# Patient Record
Sex: FEMALE | Race: WHITE | Employment: STUDENT | URBAN - METROPOLITAN AREA
[De-identification: names, ages, dates, MRNs, and addresses within clinical notes are randomized per-mention and may not be internally consistent; named-entity substitution may affect disease eponyms.]

---
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
## 2019-01-28 ENCOUNTER — HOSPITAL ENCOUNTER (EMERGENCY)
Facility: CLINIC | Age: 19
Discharge: HOME | End: 2019-01-28
Attending: EMERGENCY MEDICINE | Admitting: EMERGENCY MEDICINE
Payer: COMMERCIAL

## 2019-01-28 VITALS
OXYGEN SATURATION: 99 % | DIASTOLIC BLOOD PRESSURE: 81 MMHG | SYSTOLIC BLOOD PRESSURE: 113 MMHG | RESPIRATION RATE: 18 BRPM | HEART RATE: 57 BPM | TEMPERATURE: 98.1 F | WEIGHT: 160 LBS | BODY MASS INDEX: 26.63 KG/M2

## 2019-01-28 DIAGNOSIS — F32.A DEPRESSION, UNSPECIFIED DEPRESSION TYPE: ICD-10-CM

## 2019-01-28 LAB
AMPHETAMINES UR QL SCN: NEGATIVE
ANION GAP SERPL CALCULATED.3IONS-SCNC: 7 MMOL/L (ref 3–14)
BARBITURATES UR QL: NEGATIVE
BASOPHILS # BLD AUTO: 0.1 10E9/L (ref 0–0.2)
BASOPHILS NFR BLD AUTO: 0.6 %
BENZODIAZ UR QL: NEGATIVE
BUN SERPL-MCNC: 12 MG/DL (ref 7–30)
CALCIUM SERPL-MCNC: 8.7 MG/DL (ref 8.5–10.1)
CANNABINOIDS UR QL SCN: POSITIVE
CHLORIDE SERPL-SCNC: 109 MMOL/L (ref 96–110)
CO2 SERPL-SCNC: 23 MMOL/L (ref 20–32)
COCAINE UR QL: NEGATIVE
CREAT SERPL-MCNC: 0.71 MG/DL (ref 0.5–1)
DIFFERENTIAL METHOD BLD: NORMAL
EOSINOPHIL # BLD AUTO: 0.1 10E9/L (ref 0–0.7)
EOSINOPHIL NFR BLD AUTO: 1.3 %
ERYTHROCYTE [DISTWIDTH] IN BLOOD BY AUTOMATED COUNT: 12.1 % (ref 10–15)
ETHANOL SERPL-MCNC: <0.01 G/DL
GFR SERPL CREATININE-BSD FRML MDRD: >90 ML/MIN/{1.73_M2}
GLUCOSE SERPL-MCNC: 89 MG/DL (ref 70–99)
HCG SERPL QL: NEGATIVE
HCT VFR BLD AUTO: 40.7 % (ref 35–47)
HGB BLD-MCNC: 13.7 G/DL (ref 11.7–15.7)
IMM GRANULOCYTES # BLD: 0 10E9/L (ref 0–0.4)
IMM GRANULOCYTES NFR BLD: 0.2 %
LITHIUM SERPL-SCNC: <0.2 MMOL/L (ref 0.6–1.2)
LYMPHOCYTES # BLD AUTO: 1.9 10E9/L (ref 0.8–5.3)
LYMPHOCYTES NFR BLD AUTO: 22.7 %
MCH RBC QN AUTO: 30.9 PG (ref 26.5–33)
MCHC RBC AUTO-ENTMCNC: 33.7 G/DL (ref 31.5–36.5)
MCV RBC AUTO: 92 FL (ref 78–100)
MONOCYTES # BLD AUTO: 0.8 10E9/L (ref 0–1.3)
MONOCYTES NFR BLD AUTO: 9.7 %
NEUTROPHILS # BLD AUTO: 5.4 10E9/L (ref 1.6–8.3)
NEUTROPHILS NFR BLD AUTO: 65.5 %
NRBC # BLD AUTO: 0 10*3/UL
NRBC BLD AUTO-RTO: 0 /100
OPIATES UR QL SCN: NEGATIVE
PCP UR QL SCN: NEGATIVE
PLATELET # BLD AUTO: 269 10E9/L (ref 150–450)
POTASSIUM SERPL-SCNC: 4.1 MMOL/L (ref 3.4–5.3)
RBC # BLD AUTO: 4.43 10E12/L (ref 3.8–5.2)
SODIUM SERPL-SCNC: 139 MMOL/L (ref 133–144)
WBC # BLD AUTO: 8.3 10E9/L (ref 4–11)

## 2019-01-28 PROCEDURE — 36415 COLL VENOUS BLD VENIPUNCTURE: CPT | Performed by: EMERGENCY MEDICINE

## 2019-01-28 PROCEDURE — 85025 COMPLETE CBC W/AUTO DIFF WBC: CPT | Performed by: EMERGENCY MEDICINE

## 2019-01-28 PROCEDURE — 84703 CHORIONIC GONADOTROPIN ASSAY: CPT | Performed by: EMERGENCY MEDICINE

## 2019-01-28 PROCEDURE — 80178 ASSAY OF LITHIUM: CPT | Performed by: EMERGENCY MEDICINE

## 2019-01-28 PROCEDURE — 90791 PSYCH DIAGNOSTIC EVALUATION: CPT

## 2019-01-28 PROCEDURE — 99285 EMERGENCY DEPT VISIT HI MDM: CPT | Mod: 25

## 2019-01-28 PROCEDURE — 80320 DRUG SCREEN QUANTALCOHOLS: CPT | Performed by: EMERGENCY MEDICINE

## 2019-01-28 PROCEDURE — 80048 BASIC METABOLIC PNL TOTAL CA: CPT | Performed by: EMERGENCY MEDICINE

## 2019-01-28 PROCEDURE — 80307 DRUG TEST PRSMV CHEM ANLYZR: CPT | Performed by: EMERGENCY MEDICINE

## 2019-01-28 RX ORDER — METHYLPHENIDATE HYDROCHLORIDE 20 MG/1
40 TABLET ORAL 2 TIMES DAILY
COMMUNITY

## 2019-01-28 RX ORDER — LITHIUM CARBONATE 600 MG/1
600 CAPSULE ORAL
COMMUNITY

## 2019-01-28 ASSESSMENT — ENCOUNTER SYMPTOMS
NERVOUS/ANXIOUS: 1
DYSPHORIC MOOD: 1

## 2019-01-28 NOTE — ED TRIAGE NOTES
"Pt presents c/o feeling depressed, \"unable to calm down and feels she's better off dead. Pt is A&O, ABC's intact, cooperative. Pt placed on KALEY, provider at bedside.   "

## 2019-01-28 NOTE — ED AVS SNAPSHOT
[facility] Emergency Department  [address]  [facility] [address]  Phone:  [phone]  Fax:  [phone]                                    Nancy Pinto   MRN: [id number]    Department:  [facility] Emergency Department   Date of Visit:  1/28/2019           After Visit Summary Signature Page    I have received my discharge instructions, and my questions have been answered. I have discussed any challenges I see with this plan with the nurse or doctor.    ..........................................................................................................................................  Patient/Patient Representative Signature      ..........................................................................................................................................  Patient Representative Print Name and Relationship to Patient    ..................................................               ................................................  Date                                   Time    ..........................................................................................................................................  Reviewed by Signature/Title    ...................................................              ..............................................  Date                                               Time          22EPIC Rev 08/18
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
## 2019-12-24 ENCOUNTER — HOSPITAL ENCOUNTER (EMERGENCY)
Facility: CLINIC | Age: 19
Discharge: HOME | End: 2019-12-24
Attending: PSYCHIATRY & NEUROLOGY | Admitting: PSYCHIATRY & NEUROLOGY
Payer: COMMERCIAL

## 2019-12-24 VITALS
OXYGEN SATURATION: 98 % | DIASTOLIC BLOOD PRESSURE: 82 MMHG | HEART RATE: 55 BPM | RESPIRATION RATE: 16 BRPM | TEMPERATURE: 98.7 F | SYSTOLIC BLOOD PRESSURE: 121 MMHG

## 2019-12-24 DIAGNOSIS — Z86.59 HISTORY OF BIPOLAR DISORDER: ICD-10-CM

## 2019-12-24 DIAGNOSIS — F43.0 EMOTIONAL CRISIS, ACUTE REACTION TO STRESS: ICD-10-CM

## 2019-12-24 PROCEDURE — 90791 PSYCH DIAGNOSTIC EVALUATION: CPT

## 2019-12-24 PROCEDURE — 80320 DRUG SCREEN QUANTALCOHOLS: CPT | Performed by: FAMILY MEDICINE

## 2019-12-24 PROCEDURE — 81025 URINE PREGNANCY TEST: CPT | Performed by: FAMILY MEDICINE

## 2019-12-24 PROCEDURE — 99283 EMERGENCY DEPT VISIT LOW MDM: CPT | Mod: Z6 | Performed by: PSYCHIATRY & NEUROLOGY

## 2019-12-24 PROCEDURE — 80307 DRUG TEST PRSMV CHEM ANLYZR: CPT | Performed by: FAMILY MEDICINE

## 2019-12-24 PROCEDURE — 99283 EMERGENCY DEPT VISIT LOW MDM: CPT | Performed by: PSYCHIATRY & NEUROLOGY

## 2019-12-24 ASSESSMENT — ENCOUNTER SYMPTOMS
MUSCULOSKELETAL NEGATIVE: 1
GASTROINTESTINAL NEGATIVE: 1
RESPIRATORY NEGATIVE: 1
NEUROLOGICAL NEGATIVE: 1
CARDIOVASCULAR NEGATIVE: 1
EYES NEGATIVE: 1
CONSTITUTIONAL NEGATIVE: 1
AGITATION: 1
NERVOUS/ANXIOUS: 1

## 2019-12-24 NOTE — ED AVS SNAPSHOT
[facility], [place], Emergency Department  [address]  [place] [address]  Phone:  [phone]  Fax:  [phone]                                    Nancy Pinto   MRN: [id number]    Department:  [facility], Emergency Department   Date of Visit:  12/24/2019           After Visit Summary Signature Page    I have received my discharge instructions, and my questions have been answered. I have discussed any challenges I see with this plan with the nurse or doctor.    ..........................................................................................................................................  Patient/Patient Representative Signature      ..........................................................................................................................................  Patient Representative Print Name and Relationship to Patient    ..................................................               ................................................  Date                                   Time    ..........................................................................................................................................  Reviewed by Signature/Title    ...................................................              ..............................................  Date                                               Time          22EPIC Rev 08/18
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
## 2019-12-24 NOTE — ED NOTES
"On assessment pt is denying any suicidal or homicidal ideations. Pt did state that she was pretty upset earlier when things escalated at the house, \"I have bipolar and I was really upset and was yelling and saying things\". Pt came in with a transport hold.  "

## 2019-12-24 NOTE — ED NOTES
Patient alert/oriented. Stable on feet. AVS reviewed. Patient denies questions or concerns at discharge, patient reports feeling safe to discharge. Patient belongings returned. Patient parents picking patient up and patient plans to go to work tonight. Safe to discharge.

## 2019-12-24 NOTE — ED NOTES
Bed: ED16B  Expected date: 12/24/19  Expected time: 2:36 PM  Means of arrival: Ambulance  Comments:  Paulo 18yo female, mental health eval on a hold, cooperative

## 2019-12-24 NOTE — DISCHARGE INSTRUCTIONS
Please restart your meds. Follow-up with your psychiatrist and work on clarifying your med regimen. Consider bringing your bag of meds so the unused ones can be properly disposed  Follow-up P-referred therapist  Follow-up [facility] care and services

## 2023-01-10 NOTE — DISCHARGE SUMMARY
"Psychiatric Discharge Summary    Nancy Pinto MRN# [id number]   Age: 16 year old YOB: 2000     Date of Admission:  1/6/2017  Date of Discharge:  1/10/2017  Admitting Physician:  Laxmi Rosales MD  Discharge Physician:  Laxmi Rosales MD          Event Leading to Hospitalization:   Nancy Pinto is a 16 year old female with a past psychiatric history of depression, anxiety, ADHD who presents with s/p suicide attempt/gesture--took \"some pills.\"  States for the past week has \"not been doing well\"--worsening depression and anxiety though feels depression is more predomininant.  Feels prior to starting oral extended release form of methylphenidate had been doing better with current meds and daytrana. Though had gotten benefit from the prozac and abilify rates improvement at 3/5 with 5 being best or where would like to have sxs be at.  Though had not worked with therapist for a while, plan was to restart therapy as had found it helpful.      Significant symptoms include SI, irritable, depressed, poor frustration tolerance, substance use, impulsive and anxiety.    There is genetic loading for mood and anxiety.  Medical history does appear to be significant for s/p OD.  Substance use does appear to be playing a contributing role in the patient's presentation.  Patient appears to cope with stress/frustration/emotions by using substances and acting out to self and immature defenses.  These limitations are adversely impacting sxs, treatment, function.  Current stressors that are exacerbating sxs include \"coming clean to my parents\" informing them has been sexually active and using drugs, chronic mental health issues.  Patient's support system includes family and peers.    Below are other factors impacting patients risks contributing to hospitalization and continued struggles with psychiatric and substance use disorders:  --Risk/precipitating factors: SI, maladaptive coping, substance use, impulsive and " [address]  Department of Interventional Radiology  [facility]  [phone] Office  [phone] Fax    DRAIN/PORT/CATHETER REMOVAL  DISCHARGE INSTRUCTIONS    General Information:     Your doctor has ordered for us to remove your drain, port, or catheter. This could be that you do not need it anymore, it is not doing its job, your physician has decided on another plan for your treatment and/or it may need replacing. Home Care Instructions: You can resume your regular diet and medication regimen. Do not drink alcohol, drive, or make any important legal decisions in the next 24 hours. Do not lift anything heavier than a gallon of milk, or do anything strenuous for the next 24 hours. You will notice a glue over the site of the removal. This glue will slowly fall off your skin. The nurse who discharges you to home should review with you any wound care instructions. Resume your normal level of activity slowly. You may shower after 24 hours, but do not take a bath, swim or immerse yourself in water until the site has healed. Pat site dry once you are done showering. Do not rub site. Call If:     You should call your Physician and/or the Radiology Nurse if you have any bleeding other than a small spot on your bandage. Call if you have any signs of infection, fever, or increased pain at the site of the tube. Call if the oozing increases, if it changes color, or begins to have an odor. Follow-Up Instructions: Please see your ordering doctor as he/she has requested. You have received sedation medications today. YOU SHOULD NOT DRIVE FOR 24 HOURS, DO NOT OPERATE HEAVY MACHINERY, DO NOT MAKE ANY LEGAL DECISIONS OR SIGN LEGAL DOCUMENTS FOR 24 HOURS. DO NOT DRINK ALCOHOL, TAKE ANY MEDICATIONS UNLESS PRESCRIBED BY YOUR DOCTOR. IF YOU ARE A CAREGIVER, SOMEONE SHOULD TAKE THAT ROLE FOR 24 HOURS.        Side effects of sedation medications and other medications used today have been reviewed  Those side effects can include but are not limited to: dizziness, drowsiness, poor balance, fatigue, sleepiness. Take precautions at home to prevent falls, such as assistance with walking or stairs if allowed and /or when needed or position changes. Allergic or adverse reactions could include nausea, itching, hives, dizziness, or anything else out of the ordinary. Should you experience any of these significant changes, please call [phone] between the hours of 7:30 am and 3:30 pm or [phone] after hours. After hours, ask the  to page the X-ray Technologist, and describe the problem to the technologist. If you are experiencing chest pain, shortness of breath, altered mental state, unusual bleeding or any other emergent symptom you should call 911 immediately.             Patient Signature:  Date: 1/10/2023  Discharging Nurse: Eron Moore RN past behaviors    --Predisposing factors:  family genetics, family dysfunction/insecure attachment, impulsive, substance use, maladaptive coping/limited insight, out of proportion aggression/reactions/neg pattern of behavior, appears likely dealing with highly expressed emotional level within family, poor access/inconsistent access to care/treatment, poor/adversive social supports/peers, limited socioeconomic resources    --Perpetuating factors:   SI, SIB, help rejecting pattern/limited acces to treatment, poor/limited treatment response, dysfunctional/hostile environments, transgenerational problems    Below are factors that could support resilience and improved prognosis:    --Protective factors: appropriate supports, engaged in school, appropriate coping and regulation ability, physically healthy, intact reality testing, cognitive function within normal    Medical necessity for hospitalization supported by:  --Risk for harm is moderate-high, as pt with inability to keep self safe and on going substance use further exacerbates sxs and impaired function to point where pt requiring structure, routine in a secured setting     --Appears ability to manage pt's safety and symptoms in family/community setting is currently overwhelmed necessitating need for close and continuous monitoring with active interventions    --Due to persistent concerns over safety, struggles with symptoms and function as noted above, pt admitted to [facility] for necessary safety measures unable to be provided at lower levels of care, for further monitoring, stabilization, and assessment of diagnoses, disposition needs.       See Admission note by Laxmi Rosales MD on 1/7/2017 for additional details.          Diagnoses/Plans/Hospital Course/Consults:     Principal Diagnosis: MDD, severe, recurrent, without psychotic features; ADHD-combined type    Unit: [facility]      Attending: Bobby       Medications: on going management as indicated; risks/benefits  discussed with guardian/patient.  At time of d/c pt reports tolerating, feeling pretty good with current med regimen, and denies SE.       --PTA medication is continued as below:             -Abilify 10 mg QHS targeting depression, moodiness             -Prozac daily dose increased to 30 mg QAM             -Methylphenidate was held as pt and mom had reported since starting oral extended release form when unable to get patch, difficulties with mood, behaviors, function started and progressively worsened.  Reviewed they will f/u with out pt psychiatrist as to how to proceed since pt also has failed trial of Adderall.      Secondary psychiatric diagnoses of concern this admission:    1-Unspecified Anxiety Disorder        -monitor, provide nonpharm support, medication as above     2-Cannabis Use Disorder          -monitor, attend groups, obtain collateral info, CD Assessment,  CD Education re research showing family involvement is an important component for treatment interventions targeting youth a strong recommendation is made for referral to fam therapy such as Multidimensional Family Therapy, an out-patient family based treatment or Functional Family Therapy which is a family systems based treatment approach that includes completing a functional family assessment to help understand how family problems/dysfunction contribute to maintenance of substance abuse and behavior problems. Recommend family attend Al-Anon and patient AA.  There is research supporting individuals with SUDs who participate in 12-step Self Help Groups tend to experience better alcohol and drug use outcomes than do individuals who do not participate in these groups.     3-Disruptive, Impulse Control Disorders         -monitor, review unit rules and expectations, development of safety/deescalating plans, nonpharmacological supports, medication as above            Consults:  - Peds for sexual health counseling, menorrhagia/dysmenorrhea and mild  transaminitis. Please refer to note written on 1/7/17 for additional information.        Medical diagnoses to be addressed this admission:  Sexual health, S/P OD ingestion, H/O Migraine HA  Plan: IP Pediatrics, monitor, supportive interventions as need, meds as need    Relevant psychosocial stressors: problems with primary support group; problems related to psychosocial environment/circumstance and appropriate social support;  academic problems    Legal Status: Voluntary    Safety Assessment:   Precautions: no additional  Patient was placed under status 15 (15 minute checks) to ensure patient safety.  Staff continued to monitor for safety throughout course of hospitalization.    Suicide Risk:  Risk Factors:  psychiatric disorder including mood disorder; prior SA; drug use which includes engaging in high risk behaviors and significant trouble/disciplinary problems; bullying victim; significant hopelessness/guilt/shame/worthlessness    Protective Factors:  some connections to family/friends/community; relatively easy access to appropriate clinical interventions; effective care for psychiatric/substance use/phyical disorders    Nancy Pinto did participate in groups and was visible in the milieu. Pt completed safety plan which was reviewed by staff with pt and family.  Pt and guardian/those involved in pt's care encouraged to use safety plan post hospitalization.    Patient participated in unit treatment groups and other interventions available as part of therapeutic milieu and during hospitalization was able to demonstrate use of appropriate adaptive coping skills.     The patient's symptoms of SI, irritable, depressed, poor frustration tolerance, substance use, impulsive and anxiety.improved throughout the course of hospitalization.    The following were results from assessments completed during pt's hospitalization.  Concern raised re CD issues. CD assessment completed, see 1/9/17 note.  Dimension Scale  Ratings:    Dimension 1: 0 Client displays full functioning with good ability to tolerate and cope with withdrawal discomfort. No signs or symptoms of intoxication or withdrawal or resolving signs or symptoms.    Dimension 2: 0 Client displays full functioning with good ability to cope with physical discomfort.    Dimension 3: 2 Client has difficulty with impulse control and lacks coping skills. Client has thoughts of suicide or harm to others without means; however, the thoughts may interfere with participation in some treatment activities. Client has difficulty functioning in significant life areas. Client has moderate symptoms of emotional, behavioral, or cognitive problems. Client is able to participate in most treatment activities.    Dimension 4: 1 Client is motivated with active reinforcement, to explore treatment and strategies for change, but ambivalent about illness or need for change.    Dimension 5: 2 (A) Client has minimal recognition and understanding of relapse and recidivism issues and displays moderate vulnerability for further substance use or mental health problems. (B) Client has some coping skills inconsistently applied.    Dimension 6: 0 Client is engaged in structured, meaningful activity and has a supportive significant other, family, and living environment.      Diagnostic Summary    Alcohol / Drug Use Disorder Diagnostic Criteria  A problematic pattern of alcohol/drug use leading to clinically significant impairment or distress, as manifested by at least two of the following, occurring within a 12-month period:              Recurrent alcohol/drug use resulting in a failure to fulfill major role obligations at work, school, or home.  Continued alcohol/ drug use despite having persistent or recurrent social or interpersonal problems caused or exacerbated by the effects of alcohol/drug.  Important social, occupational, or recreational activities are given up or reduced because of alcohol/drug  use.    DSM 5 Diagnosis:              305.20 (F12.10) Cannabis Use Disorder Mild  In a controlled environment      Due to patient reports of history of significant stress and discord within fam, Family assessment completed, see 1/8/17 note.  Therapist's Assessment  Parents presented as calm, pleasant, and cooperative. Parents are healthy, stable, and supportive people. It is easily observed they unconditionally love their daughter and are very concerned about her. Parents lack insight and understanding into mental health and their daughter's concerns. However, parents are very impressionable and are seeking feedback, suggestion, and insight. Parents are invested and at this point understand that personal change is needed to help facilitate change in their daughter. Parental subsystem is stable and intact. Hierarchy has been quite weak. Parents both admit they have been to lenient and give in often. Communication within parental subsystem seems to be healthy but lacks with their daughter. Cohesion seems to also be lacking, but desired by family. Emotional expression also seems to be a needed growth. Writer challenged and empowered parents to voice their future consistency, teamwork, and new expectations for the future. Writer also urged them to be active listeners but also voice their emotions. Parents welcome direction and seem to catch on quickly. Parents have reasonable expectations with intent of increasing safety and promoting health.    Pt joined the meeting. Pt presented as emotionally flat but calm and cooperative. Pt listened as parents and writer surfaced the concerns present. She nodded in agreement and never objected. Pt is help seeking and understands that she has been struggling. Pt was mature and accepting of everything that was said in the meeting. She remained respectful and open minded. Pt eventually became emotional and voiced her struggles with her parents, pertaining to the distance between them,  perceived disappointment in her, and few commonalities. Parents listened without interruption, did what writer thinks as their best job validating and acknowledging but attempted to make explanation. Their explanations were sound, and challenged their daughter's negative thinking but also may have minimized her truth behind her emotional struggles. The family had some beneficial, therapeutic dialogue that surfaced a lot of damaged emotions, and areas that need to be addressed. Communication, emotional expression, consistency, structure, hierarchy, and expectations were all touched on. Parents as well as pt did a good job of managing emotions but expressing them in a healthy way.     Safety Reminders: Spoke with mother and father regarding locking up medications. Family reports that patient will not have access to firearms or weapons.        Nancy Pinto was discharged to parent to con't treatment with community providers. At the time of discharge evaluation Nancy Pinto was determined to not be a danger to herself or others  (elevated to some degree given past behaviors, diagnoses).  .   Care was coordinated with pt, parent.         Labs:     Results for orders placed or performed during the hospital encounter of 01/06/17   PEDS IP consult: Patient to be seen: Routine within 24 hrs; Call back #: [phone]; pt would like to start birth control, poss. STD testing; Consultant may enter orders: Yes    Narrative    Hillary Khan APRN CNS     1/7/2017  3:27 PM    Pediatric Hospitalist Consult Note  01/07/2017    Nancy Pinto  MRN [id number]  YOB: 2000  Age: 16 year old  Date of Admission: 1/6/2017  8:02 PM    Reason for consult: I was asked by Laxmi Rosales MD to evaluate   Nancy for birth control counseling.      Subjective:  Nancy Pinto is a 16 year old female with a history of   depression & ADHD who is currently admitted to the  inpatient   psych unit secondary to  suicide attempt by drug overdose who   presents for birth control counseling. Nancy reports that she   is currently sexually active with a male partner. She has had a   total of 5-6 male partners. She was last sexually active one week   ago. No condom was used at that time. Nancy reports that she   tested positive for chlamydia 3 months ago, received treatment   and had repeat STI screening at [facility]. She was   reportedly tested for chlamydia, gonorrhea, HIV and syphilis at   that time, all of which were negative. She declines repeat   screening at this time.     Nancy reports a history of menorrhagia and dysmenorrhea. She   also reports nausea and sometimes vomiting the first day of her   period. Nancy also reports that she becomes more irritable with   mood swings around the time of her period. She denies a history   of anemia but reports a history of low iron stores. She expressed   an interest in oral contraceptives to help alleviate her   menstrual symptoms. Nancy reports a regular monthly period.   Last menstruated 1-2 weeks ago. Pregnancy test completed about 2   weeks ago at [facility], which was negative.       PAST MEDICAL HISTORY:   Past Medical History   Diagnosis Date     ADHD (attention deficit hyperactivity disorder)      Migraines      Concussion      x 2     Depression      Suicide attempt (H)      x2     Menorrhagia with regular cycle      Dysmenorrhea in adolescent      S/P appendectomy        PAST SURGICAL HISTORY:   Past Surgical History   Procedure Laterality Date     Appendectomy       5 years old       FAMILY HISTORY:   Family History   Problem Relation Age of Onset     CEREBROVASCULAR DISEASE Paternal Grandfather      DIABETES Paternal Grandfather        SOCIAL HISTORY:   Social History   Substance Use Topics     Smoking status: Current Every Day Smoker -- 0.25 packs/day     Smokeless tobacco: Former User     Alcohol Use: No       ALLERGIES:  Review of patient's  allergies indicates no known allergies.      MEDICATIONS:  I have reviewed this patient's current medications  Current Facility-Administered Medications   Medication     lidocaine (LMX4) kit     OLANZapine zydis (zyPREXA) ODT tab 5 mg    Or     OLANZapine (zyPREXA) injection 5 mg     diphenhydrAMINE (BENADRYL) capsule 25 mg    Or     diphenhydrAMINE (BENADRYL) injection 25 mg     hydrOXYzine (ATARAX) tablet 25 mg     ibuprofen (ADVIL/MOTRIN) tablet 600 mg     melatonin tablet 3 mg     alum & mag hydroxide-simethicone (MYLANTA ES/MAALOX  ES)   suspension 30 mL     benzocaine-menthol (CHLORASEPTIC) 6-10 MG lozenge 1 lozenge     calcium carbonate (TUMS) chewable tablet 500 mg       ROS: 10 point ROS neg other than the symptoms noted above in the   HPI.      Objective:    Vitals were reviewed  Temp: 97.5  F (36.4  C) Temp src: Oral BP: 114/71 mmHg Pulse: 81     Resp: 18   O2 Device: None (Room air)       Appearance: Alert and appropriate, well appearing, normally   responsive, no acute distress   HEENT: Head: Normocephalic and atraumatic. Eyes: Wearing glasses,   lids and lashes normal, PERRL, EOM grossly intact, conjunctivae   and sclerae clear. Ears: External ears without deformity or   lesions. Nose: No active discharge. Mouth/Throat: Oral mucosa   pink and moist, no oral lesions.   Respiratory: Respirations unlabored, no respiratory distress.  Gastrointestinal: Normal bowel sounds, soft, nontender,   nondistended, with no masses and no hepatosplenomegaly.  Neurologic: Alert and oriented, mentation intact, speech normal.   Cranial nerves II-XII grossly intact, moving all extremities   equally with grossly normal coordination, stable gait.   Neuropsychiatric: General: calm and normal eye contact Affect:   pleasant  Integument: normal skin color, texture, turgor, no rashes, no   lesions, no abnormal moles, nails without discoloration or   clubbing and no jaundice.       Labs:    CBC RESULTS:   Recent Labs   Lab Test   01/06/17   1248   WBC  7.5   RBC  4.57   HGB  13.2   HCT  40.8   MCV  89   MCH  28.9   MCHC  32.4   RDW  13.6   PLT  318     CMP: mildly elevated ALT (58) & AST (69), otherwise normal  U Hcg: negative  U Tox: positive for cannabinoids, otherwise negative        Assessment/Plan:    Birth Control Counseling  - Multiple birth control options discussed. Nancy expressed an   interest in starting oral contraceptives during this   hospitalization for pregnancy prevention and to help alleviate   menstrual symptoms reported. Unable to start oral contraceptives   during this visit given history of unprotected sex 1 week ago.   Will recheck serum pregnancy test on Thursday to confirm negative   pregnancy results and consider starting oral contraceptives at   that time if negative.  - Beta HCG scheduled for AM draw on 1/12/17.  - Pediatrics will follow up on results & intervene as indicated.    Sexual Health Counseling  - STI screening discussed as well as risks and benefits of early   diagnosis & treatment.  - STI screening declined at this time. Screening was completed at   [facility] ~ 2 weeks ago, negative for gonorrhea,   chlamydia, HIV & syphilis.  - Encouraged condom use to prevent STI transmission and prevent   pregnancy.  - Follow up every 3-6 months for STI screening with PCP or   [facility].    Menorrhagia, Dysmenorrhea  - Recommend oral contraceptives to help alleviate symptoms. Will   consider starting during this admission if repeat serum HCG is   negative.  - Nancy reports a history of iron deficiency. Will complete   iron studies during this admission to determine if   supplementation is necessary.  - Denies history of anemia. CBC grossly normal without anemia or   red cell microcytosis.   - If oral contraceptives are not started this admission, follow   up with PCP or [facility] to obtain contraceptives.    Mild Transaminitis  - Mildly elevated ALT & AST noted on admission labs.  "Liver   enzymes within normal limits according to lab history in Epic.   Potentially elevated secondary to overdose.  - Repeat hepatic panel scheduled for 1/12/17 to reevaluate.  - Pediatrics will follow up on results & intervene as indicated.     Thank you for this consultation.  Please do not hesitate to   contact the [facility] Hospitalist Team if other questions or concerns   arise.    Yeimi Khan, DNP, APRN, PCNS-BC  Pediatric Hospitalist  Pager: [phone]       Rapid strep screen   Result Value Ref Range    Specimen Description Throat     Rapid Strep A Screen       NEGATIVE: No Group A streptococcal antigen detected by immunoassay, await   culture report.      Micro Report Status FINAL 01/07/2017    Throat Culture Aerobic Bacterial   Result Value Ref Range    Specimen Description Throat     Culture Micro       Moderate growth Normal debra  Culture in progress      Micro Report Status Pending               Discharge Medications:     Current Discharge Medication List      CONTINUE these medications which have CHANGED    Details   FLUoxetine (PROZAC) 10 MG capsule Take 3 capsules (30 mg) by mouth daily  Qty: 90 capsule, Refills: 0    Associated Diagnoses: Depressive disorder      ARIPiprazole (ABILIFY) 10 MG tablet Take 1 tablet (10 mg) by mouth daily  Qty: 30 tablet, Refills: 0    Associated Diagnoses: Depressive disorder         CONTINUE these medications which have NOT CHANGED    Details   IBUPROFEN PO Take 600 mg by mouth at onset of headache for moderate pain For migraines         STOP taking these medications       METHYLPHENIDATE HCL PO Comments:   Reason for Stopping:         METHYLPHENIDATE HCL PO Comments:   Reason for Stopping:         methylphenidate (DAYTRANA) 30 MG/9HR Comments:   Reason for Stopping:                    Psychiatric Examination:   Appearance:  awake, alert, adequately groomed, no apparent distress and casually dressed  Attitude:  cooperative  Eye Contact:  good  Mood:  \"good, really excited " "to go home\"  Affect:  mood congruent  Speech:  clear, coherent and normal prosody  Psychomotor Behavior:  no evidence of tardive dyskinesia, dystonia, or tics and psychomotor WNL  Thought Process:  goal oriented  Associations:  no loose associations  Thought Content:  Denies SI/SIB/HI/perceptual disturbance sxs  Insight:  fair  Judgment:  fair  Oriented to:  time, person, and place  Attention Span and Concentration:  intact  Recent and Remote Memory:  intact  Language: no grossly observed problems with expression, reception  Fund of Knowledge: appropriate  Muscle Strength and Tone: normal  Gait and Station: Normal             Discharge Plan:       1. Referral and Recommendations: Individual therapy, psychiatric Care and Medium Intensity outpatient treatment through [facility] in [place].   Intake: Referral for Medium Intensity program made. They will contact you to set up intake.   2. Therapist: Jessie through [facility] ([place])-- therapist. Pt does have an appointment set up for 1/13 at 10:00 am.   Individual and Family therapy is highly recommended for a successful recovery.   3. Psychiatrist: Dr. PJ Johnson, [facility], [place] resume care as arranged PTA.      Recommend therapy, individual and family, and psychiatric care continue as part of pt's treatment plan.  As pt with comorbid MI & CD issues, strongly recommend an integrated treatment plan addressing both be continued.            PCP:   Dr. Peterson, [facility], [place]       Continue medical care as need         Follow Up and recommended labs and tests       Liver enzymes (ALT and AST) were slightly elevated on admission.  Please follow-up with your primary care provider to recheck liver enzymes to ensure they have normalized.  Due to history of iron deficiency anemia, you should also have your iron studies rechecked if this has not been done recently.                    Lifestyle Changes:   1. Abstain " from using any mood altering substance; use relapse prevention plan developed and share this plan with family and other supportive individuals   2. Maintain compliance with treatment recommendations; take any medications as prescribed; call provider with any concerns, worsening of symptoms/function, or should benefit to target symptoms not happen as anticipated; do not stop taking medications without talking to your provider; use developed symptom management plan and share plan with family and other supportive individuals  3. Avoid friends/ people who are known drug users; continue with efforts to identify and participate in healthy, drug free activities; continue with efforts to develop substance free social network that can be supportive of your treatment goals  4. Your environment should be healthy (good sleep hygiene, healthy diet, regular exercise, etc) and free of substance use/abuse, this includes maintaining sober home environment with readily available support  5.  Recommendation is for frequent and regular attendance of AA/NA meetings and maintenance of regular contact with sponsor; your family and friends are strongly encouraged to attend Al-Anon  6. Follow your home engagement contract   7. Establish/Maintain contact with school counselor so you may have individual available to help you with any school related concerns and an individual who may help you, if need, with obtaining accommodations or other academic support for pt's multiple psychiatric diagnoses; Consider Sober School if necessary    8.  As with any chronic illness, waxing and waning of symptoms and function is expected, therefore recommend all medications, firearms, other objects that may be of concern be securely locked or removed from the home, use safety plan developed during hospitalization and share with family   9.  Encourage family/primary care givers to follow through with any treatment recommendations including family  therapy/interventions; monitor patient's compliance with treatment and ensure any medication refills are obtained in a timely manner and appointments are scheduled and kept; recommend regular communication be maintained with school and others involved in your child's care; should you have concerns re treatment or treatment interventions, please call provider as soon as possible to share concerns with them  10.Recommend following [facility] resources/supports, call [facility] or go to their web site for specific info as to locations and times: Young Adult [facility] Connection Groups=community support groups for 16-20 yr olds; Parents may also want to consider [facility] support groups for parents or [facility]'s Parent Warm Line which is a support for parents who are unable to attend groups as they will connect via phone with a parent peer specialists    AA/NA meetings for patient and [place]ang meetings for family. Call [facility] for times and venues at [phone].   Additional info/resources may be obtained by parents of teens with substance use problems through calling [employer] or going to website at [email or web address]     Refer to above hospital section for additional recommendations.      Refer to AVS for additional detail re discharge recommendations and information provided to pt and family.        Attestation:  The patient has been seen and evaluated by me,  Laxmi Rosales MD    Thank you for allowing us to participate in care of Nancy Pinto.